# Patient Record
Sex: FEMALE | URBAN - METROPOLITAN AREA
[De-identification: names, ages, dates, MRNs, and addresses within clinical notes are randomized per-mention and may not be internally consistent; named-entity substitution may affect disease eponyms.]

---

## 2022-10-19 ENCOUNTER — NURSE TRIAGE (OUTPATIENT)
Dept: NURSING | Facility: CLINIC | Age: 27
End: 2022-10-19

## 2022-10-19 NOTE — TELEPHONE ENCOUNTER
"Pt reports she has severe morning sickness, nine weeks, was prescribed anti nausea medication on two occasions. Per pt not able to keep anything down today. See full assessment below.    Advised pt to go to the ED now or contact her outside ob/gyn.    Pt verbalizes understanding and agrees to plan.     Reason for Disposition    [1] SEVERE vomiting (e.g., 6 or more times/day) AND [2] present > 8 hours    Additional Information    Negative: [1] Vaginal bleeding AND [2] pregnant < 20 weeks    Negative: [1] Abdominal pain AND [2] pregnant < 20 weeks    Negative: Headache is main symptom    Negative: [1] Vomiting did NOT begin in early pregnancy (i.e., 4th-8th week of pregnancy) OR [2] 20 or more weeks pregnant at time of call    Negative: [1] Vomiting AND [2] contains red blood or black (\"coffee ground\") material  (Exception: few red streaks in vomit that only happened once)    Negative: [1] Insulin-dependent diabetes (Type I) AND [2] glucose > 400 mg/dl (22 mmol/l)    Negative: Recent head injury (within last 3 days)    Negative: Recent abdominal injury (within last 3 days)    Negative: Severe pain in one eye    Answer Assessment - Initial Assessment Questions  1. VOMITING SEVERITY: \"How many times have you vomited in the past 24 hours?\"      - MILD:  1 - 2 times/day     - MODERATE: 3 - 5 times/day, decreased oral intake without significant weight loss or symptoms of dehydration     - SEVERE: 6 or more times/day, vomits everything or nearly everything, with significant weight loss, symptoms of dehydration       10 times per pt  2. ONSET: \"When did the vomiting begin?\"       \"about three weeks ago\"   3. FLUIDS: \"What fluids or food have you vomited up today?\" \"Are you able to keep any liquids down?\"      Pt reports not able to keep anything down today\"   4. TREATMENT: \"What have you been doing so far to treat this?\"       Compazine, Reglan  5. DEHYDRATION: \"When was the last time you urinated?\" \"Are you feeling " "lightheaded?\" \"Weight loss?\"      2 pm \"a little lightheaded\" \"weight loss over past three weeks\"  6. PREGNANCY: \"How many weeks pregnant are you?\" \"How has the pregnancy been going?\"      9 weeks   7. GENIE: \"What date are you expecting to deliver?\"      5/20/22  8. MEDICATIONS: \"What medications are you taking?\" (e.g., prenatal vitamins, iron)      PNV   9. OTHER SYMPTOMS: \"Do you have any other symptoms?\"      Pt denies    Protocols used: PREGNANCY - MORNING SICKNESS (NAUSEA AND VOMITING OF PREGNANCY)-A-AH      "